# Patient Record
Sex: MALE | Race: OTHER | HISPANIC OR LATINO | Employment: FULL TIME | URBAN - METROPOLITAN AREA
[De-identification: names, ages, dates, MRNs, and addresses within clinical notes are randomized per-mention and may not be internally consistent; named-entity substitution may affect disease eponyms.]

---

## 2017-02-27 ENCOUNTER — TRANSCRIBE ORDERS (OUTPATIENT)
Dept: ADMINISTRATIVE | Facility: HOSPITAL | Age: 37
End: 2017-02-27

## 2017-02-27 DIAGNOSIS — R05.9 COUGH: Primary | ICD-10-CM

## 2017-02-27 DIAGNOSIS — R50.9 FEVER, UNSPECIFIED FEVER CAUSE: ICD-10-CM

## 2018-08-01 RX ORDER — RANITIDINE 150 MG/1
1 TABLET ORAL 2 TIMES DAILY
COMMUNITY
Start: 2013-07-19 | End: 2018-08-06

## 2018-08-01 RX ORDER — MONTELUKAST SODIUM 10 MG/1
1 TABLET ORAL
COMMUNITY
Start: 2013-07-19 | End: 2018-08-06

## 2018-08-01 RX ORDER — HYDROXYZINE 50 MG/1
1 TABLET, FILM COATED ORAL
COMMUNITY
Start: 2013-07-19 | End: 2018-08-06

## 2018-08-01 RX ORDER — METHYLPREDNISOLONE 8 MG/1
TABLET ORAL
COMMUNITY
End: 2018-08-06

## 2018-08-06 ENCOUNTER — OFFICE VISIT (OUTPATIENT)
Dept: CARDIOLOGY CLINIC | Facility: CLINIC | Age: 38
End: 2018-08-06
Payer: COMMERCIAL

## 2018-08-06 VITALS
DIASTOLIC BLOOD PRESSURE: 82 MMHG | HEIGHT: 67 IN | BODY MASS INDEX: 28.46 KG/M2 | HEART RATE: 65 BPM | WEIGHT: 181.3 LBS | SYSTOLIC BLOOD PRESSURE: 126 MMHG

## 2018-08-06 DIAGNOSIS — R00.2 PALPITATIONS: Primary | ICD-10-CM

## 2018-08-06 PROCEDURE — 99243 OFF/OP CNSLTJ NEW/EST LOW 30: CPT | Performed by: INTERNAL MEDICINE

## 2018-08-06 PROCEDURE — 93000 ELECTROCARDIOGRAM COMPLETE: CPT | Performed by: INTERNAL MEDICINE

## 2018-08-06 NOTE — PROGRESS NOTES
Cardiology Consultation     Leonora Marie  [id number]  1980  [facility] [place]  [address] [facility] [place] [id number] [facility] [address]    1  Palpitations  POCT ECG     There is no problem list on file for this patient  History of present illness    Patient has been referred to me by ER - ER physician advised him to see cardiologist after he was there with chest pain and palpitation      He is very active  Exercises 6 - 7 days a week - weights and aerobic  Healthy diet- vegetables and meat  Occasionally has used alcohol which has caused some palpitations ( scotch, not others)  No drug or nicotine use     Has long history of 1 st degree AV block - since childhood      The patient is not complaining of anginal like chest pain or chest pressure  There is no worsening orthopnea, paroxysmal nocturnal dyspnea  There is no leg swelling    Patient does get palpitation at times - 1 to few extra beats - almost daily , worse if he takes Scotch  There is no history of presyncope or syncope  There is no  history of transient  lightheadedness  There is no exertional intolerance      There is no history of snoring at night  There is no history of morning fatigability  There is no  history of daytime sleepiness          No past medical history on file  Social History     Social History    Marital status: /Civil Union     Spouse name: N/A    Number of children: N/A    Years of education: N/A     Occupational History    Not on file  Social History Main Topics    Smoking status: Never Smoker    Smokeless tobacco: Never Used    Alcohol use 8 4 oz/week     14 Glasses of wine per week    Drug use: No    Sexual activity: Not on file     Other Topics Concern    Not on file     Social History Narrative    No narrative on file      No family history on file  No past surgical history on file      Current Outpatient Prescriptions:    L-ARGININE PO, Take by mouth 2 (two) times a day, Disp: , Rfl:     L-CITRULLINE PO, Take by mouth daily 6-7 times a week, Disp: , Rfl:     L-Taurine POWD, by Does not apply route 2 (two) times a day, Disp: , Rfl:   Allergies no known allergies  Vitals:    08/06/18 0851   BP: 126/82   BP Location: Right arm   Patient Position: Sitting   Cuff Size: Large   Pulse: 65   Weight: 82 2 kg (181 lb 4 8 oz)   Height: 5' 7" (1 702 m)       Labs:No results found for: NA, K, CL, CO2, BUN, CREATININE, GLUCOSE, CALCIUM  No results found for: CKTOTAL, CKMB, CKMBINDEX, TROPONINI  No results found for: WBC, HGB, HCT, MCV, PLT  No results found for: CHOL, TRIG, HDL, LDLDIRECT  Imaging: No results found  Review of Systems:  Review of Systems   All other systems reviewed and are negative  as described in my history of present illness        Physical Exam:  Physical Exam   Constitutional: He is oriented to person, place, and time  He appears well-developed and well-nourished  No distress  Not in any distress at the current time   HENT:   Head: Normocephalic and atraumatic  Right Ear: External ear normal    Left Ear: External ear normal    Nose: Nose normal    Mouth/Throat: Uvula is midline and mucous membranes are normal    Posterior pharynx is crowded   Eyes: Conjunctivae, EOM and lids are normal  Pupils are equal, round, and reactive to light  No scleral icterus  No pallor  No cyanosis  No icterus   Neck: Trachea normal and normal range of motion  Neck supple  No JVD present  Carotid bruit is not present  No thyromegaly present  No jugular lymphadenopathy   Cardiovascular: Normal rate, regular rhythm, S1 normal, S2 normal, normal heart sounds, intact distal pulses and normal pulses  PMI is not displaced  Exam reveals no gallop, no S3, no S4 and no friction rub  No murmur heard  Pulmonary/Chest: Effort normal and breath sounds normal  No accessory muscle usage  No respiratory distress   He has no decreased breath sounds  He has no wheezes  He has no rhonchi  He has no rales  He exhibits no tenderness  Abdominal: Soft  Normal appearance and bowel sounds are normal  He exhibits no distension and no mass  There is no splenomegaly or hepatomegaly  There is no tenderness  Musculoskeletal: Normal range of motion  He exhibits no edema, tenderness or deformity  Lymphadenopathy:     He has no cervical adenopathy  Neurological: He is alert and oriented to person, place, and time  Facial symmetry is retained  Extraocular movements are retained  Head neck tongue and palate movement are retained and symmetric   Skin: Skin is intact  No abrasion, no lesion and no rash noted  No erythema  Nails show no clubbing  Psychiatric: He has a normal mood and affect  His speech is normal and behavior is normal  Thought content normal        Discussion/Summary:    1  AV block - 1 st degree  NV -280 ms  This is since childhood  Normal variant        2  Palpitations  Occasional extra beats  Happens everyday  Nothing sustained    Troubling sensation and worried about same   24 hour Holter - look for PAC and PVC load     Advised to avoid scotch  Avoid all nutritional supplements      3   Alcohol use  Certain forms of alcohol provoke it  Need to avoid same

## 2018-08-08 ENCOUNTER — PROCEDURE VISIT (OUTPATIENT)
Dept: CARDIOLOGY CLINIC | Facility: CLINIC | Age: 38
End: 2018-08-08
Payer: COMMERCIAL

## 2018-08-08 DIAGNOSIS — R00.2 PALPITATIONS: Primary | ICD-10-CM

## 2018-08-08 PROCEDURE — 93224 XTRNL ECG REC UP TO 48 HRS: CPT | Performed by: INTERNAL MEDICINE

## 2018-08-10 ENCOUNTER — HOSPITAL ENCOUNTER (OUTPATIENT)
Dept: NON INVASIVE DIAGNOSTICS | Facility: HOSPITAL | Age: 38
Discharge: HOME | End: 2018-08-10
Payer: COMMERCIAL

## 2018-08-10 DIAGNOSIS — R00.2 PALPITATIONS: ICD-10-CM

## 2018-08-10 PROCEDURE — 93226 XTRNL ECG REC<48 HR SCAN A/R: CPT

## 2018-08-10 PROCEDURE — 93227 XTRNL ECG REC<48 HR R&I: CPT | Performed by: INTERNAL MEDICINE

## 2018-08-10 PROCEDURE — 93225 XTRNL ECG REC<48 HRS REC: CPT

## 2018-08-13 ENCOUNTER — TELEPHONE (OUTPATIENT)
Dept: CARDIOLOGY CLINIC | Facility: CLINIC | Age: 38
End: 2018-08-13

## 2018-08-13 NOTE — TELEPHONE ENCOUNTER
Pt called about his holter results  He is in recall to see you in 6 months  Pt  Is asking for echo and stress test to follow up after the holter monitor was done  He is concerned about his heart  Thank you Dr Katiuska Harrington  (Tried to reach pt on the phone # he provided; no answer  [phone])-this is the wrong #  It is [phone]

## 2018-08-22 NOTE — TELEPHONE ENCOUNTER
LM re: Dr Yair Proctor note on pt's home phone number listed     Advised him to call for 6 month F/U

## 2018-09-11 ENCOUNTER — TELEPHONE (OUTPATIENT)
Dept: CARDIOLOGY CLINIC | Facility: CLINIC | Age: 38
End: 2018-09-11

## 2018-09-11 NOTE — TELEPHONE ENCOUNTER
P/C from Mrs  Pt is still having palpitations and feeling uncomfortable, wants to get a stress test        Pt says when he exerts himself, he has a heavy onset of palpitations, which he is concerned about  He is requesting a stress and echo because of the sx  Thank you

## 2018-09-12 DIAGNOSIS — R00.2 PALPITATIONS: Primary | ICD-10-CM

## 2018-09-12 DIAGNOSIS — I44.0 AV BLOCK, 1ST DEGREE: ICD-10-CM

## 2018-09-12 DIAGNOSIS — R07.9 CHEST PAIN, UNSPECIFIED TYPE: ICD-10-CM

## 2018-10-18 ENCOUNTER — HOSPITAL ENCOUNTER (OUTPATIENT)
Dept: NON INVASIVE DIAGNOSTICS | Facility: CLINIC | Age: 38
Discharge: HOME | End: 2018-10-18
Payer: COMMERCIAL

## 2018-10-18 ENCOUNTER — HOSPITAL ENCOUNTER (OUTPATIENT)
Dept: NON INVASIVE DIAGNOSTICS | Facility: CLINIC | Age: 38
Discharge: HOME | End: 2018-10-18
Attending: INTERNAL MEDICINE
Payer: COMMERCIAL

## 2018-10-18 DIAGNOSIS — I44.0 AV BLOCK, 1ST DEGREE: ICD-10-CM

## 2018-10-18 DIAGNOSIS — R00.2 PALPITATIONS: ICD-10-CM

## 2018-10-18 DIAGNOSIS — R07.9 CHEST PAIN, UNSPECIFIED TYPE: ICD-10-CM

## 2018-10-18 LAB
ARRHY DURING EX: NORMAL
CHEST PAIN STATEMENT: NORMAL
MAX DIASTOLIC BP: 90 MMHG
MAX HEART RATE: 166 BPM
MAX PREDICTED HEART RATE: 180 BPM
MAX. SYSTOLIC BP: 180 MMHG
PROTOCOL NAME: NORMAL
REASON FOR TERMINATION: NORMAL
TARGET HR FORMULA: NORMAL
TEST INDICATION: NORMAL
TIME IN EXERCISE PHASE: NORMAL

## 2018-10-18 PROCEDURE — 93016 CV STRESS TEST SUPVJ ONLY: CPT | Performed by: INTERNAL MEDICINE

## 2018-10-18 PROCEDURE — 93306 TTE W/DOPPLER COMPLETE: CPT

## 2018-10-18 PROCEDURE — 93018 CV STRESS TEST I&R ONLY: CPT | Performed by: INTERNAL MEDICINE

## 2018-10-18 PROCEDURE — 93306 TTE W/DOPPLER COMPLETE: CPT | Performed by: INTERNAL MEDICINE

## 2018-10-18 PROCEDURE — 93017 CV STRESS TEST TRACING ONLY: CPT

## 2018-10-30 ENCOUNTER — TELEPHONE (OUTPATIENT)
Dept: CARDIOLOGY CLINIC | Facility: CLINIC | Age: 38
End: 2018-10-30

## 2021-04-08 DIAGNOSIS — Z23 ENCOUNTER FOR IMMUNIZATION: ICD-10-CM
